# Patient Record
Sex: MALE
[De-identification: names, ages, dates, MRNs, and addresses within clinical notes are randomized per-mention and may not be internally consistent; named-entity substitution may affect disease eponyms.]

---

## 2020-10-31 ENCOUNTER — NURSE TRIAGE (OUTPATIENT)
Dept: OTHER | Facility: CLINIC | Age: 65
End: 2020-10-31

## 2020-10-31 NOTE — TELEPHONE ENCOUNTER
Reason for Disposition   General information question, no triage required and triager able to answer question    Protocols used: INFORMATION ONLY CALL - NO TRIAGE-ADULT-AH    Calling for closest covid testing site. Recommend he check the department of health web site for up to date testing information. Offered to triage, he declined. Attention provider: Your patient utilized nurse triage services offered by employer, payer or community. This encounter includes an overview of the reason for call, assessment and recommended disposition. Please do not respond through this encounter as the response is not directed to a shared pool.

## 2021-03-11 ENCOUNTER — HOSPITAL ENCOUNTER (OUTPATIENT)
Dept: HOSPITAL 100 - IMMUN | Age: 66
LOS: 89 days | End: 2021-06-08
Payer: MEDICARE

## 2021-03-11 VITALS — BODY MASS INDEX: 39.7 KG/M2

## 2021-03-11 DIAGNOSIS — Z23: Primary | ICD-10-CM

## 2021-03-11 PROCEDURE — 0002A: CPT

## 2021-03-11 PROCEDURE — 91300: CPT

## 2021-03-11 PROCEDURE — 0001A: CPT

## 2021-03-11 RX ADMIN — RNA INGREDIENT BNT-162B2 30 MCG: 0.23 INJECTION, SUSPENSION INTRAMUSCULAR at 17:23

## 2021-04-01 RX ADMIN — RNA INGREDIENT BNT-162B2 30 MCG: 0.23 INJECTION, SUSPENSION INTRAMUSCULAR at 17:01

## 2022-05-15 ENCOUNTER — HOSPITAL ENCOUNTER (EMERGENCY)
Age: 67
Discharge: HOME | End: 2022-05-15
Payer: COMMERCIAL

## 2022-05-15 VITALS
HEART RATE: 81 BPM | RESPIRATION RATE: 18 BRPM | OXYGEN SATURATION: 97 % | SYSTOLIC BLOOD PRESSURE: 131 MMHG | DIASTOLIC BLOOD PRESSURE: 103 MMHG | TEMPERATURE: 99.3 F

## 2022-05-15 VITALS — BODY MASS INDEX: 37.3 KG/M2

## 2022-05-15 DIAGNOSIS — Z79.84: ICD-10-CM

## 2022-05-15 DIAGNOSIS — K57.32: Primary | ICD-10-CM

## 2022-05-15 DIAGNOSIS — Z79.899: ICD-10-CM

## 2022-05-15 DIAGNOSIS — F17.210: ICD-10-CM

## 2022-05-15 DIAGNOSIS — K59.00: ICD-10-CM

## 2022-05-15 LAB
ANION GAP: 7 (ref 5–15)
BUN SERPL-MCNC: 18 MG/DL (ref 7–18)
BUN/CREAT RATIO: 17.8 RATIO (ref 10–20)
CALCIUM SERPL-MCNC: 9.1 MG/DL (ref 8.5–10.1)
CARBON DIOXIDE: 25 MMOL/L (ref 21–32)
CHLORIDE: 103 MMOL/L (ref 98–107)
DEPRECATED RDW RBC: 41.1 FL (ref 35.1–43.9)
ERYTHROCYTE [DISTWIDTH] IN BLOOD: 12.6 % (ref 11.6–14.6)
EST GLOM FILT RATE - AFR AMER: 96 ML/MIN (ref 60–?)
ESTIMATED CREATININE CLEARANCE: 76.29 ML/MIN
GLUCOSE: 113 MG/DL (ref 74–106)
HCT VFR BLD AUTO: 42.9 % (ref 40–54)
HEMOGLOBIN: 14.3 G/DL (ref 13–16.5)
HGB BLD-MCNC: 14.3 G/DL (ref 13–16.5)
IMMATURE GRANULOCYTES COUNT: 0.06 X10^3/UL (ref 0–0)
MCV RBC: 88.6 FL (ref 80–94)
MEAN CORP HGB CONC: 33.3 G/DL (ref 32–36)
MEAN PLATELET VOL.: 10.8 FL (ref 6.2–12)
MUCOUS THREADS URNS QL MICRO: (no result) /HPF
NRBC FLAGGED BY ANALYZER: 0 % (ref 0–5)
PLATELET # BLD: 240 K/MM3 (ref 150–450)
PLATELET COUNT: 240 K/MM3 (ref 150–450)
POTASSIUM: 4.1 MMOL/L (ref 3.5–5.1)
RBC # BLD AUTO: 4.84 M/MM3 (ref 4.6–6.2)
RBC DISTRIBUTION WIDTH CV: 12.6 % (ref 11.6–14.6)
RBC DISTRIBUTION WIDTH SD: 41.1 FL (ref 35.1–43.9)
RBC UR QL: (no result) /HPF (ref 0–5)
RBC UR QL: 10 /UL
SP GR UR: 1.01 (ref 1–1.03)
SQUAMOUS URNS QL MICRO: (no result) /HPF (ref 0–5)
URINE PRESERVATIVE: (no result)
WBC # BLD AUTO: 15 K/MM3 (ref 4.4–11)
WHITE BLOOD COUNT: 15 K/MM3 (ref 4.4–11)

## 2022-05-15 PROCEDURE — 99284 EMERGENCY DEPT VISIT MOD MDM: CPT

## 2022-05-15 PROCEDURE — 85025 COMPLETE CBC W/AUTO DIFF WBC: CPT

## 2022-05-15 PROCEDURE — 81001 URINALYSIS AUTO W/SCOPE: CPT

## 2022-05-15 PROCEDURE — 80048 BASIC METABOLIC PNL TOTAL CA: CPT

## 2022-05-15 PROCEDURE — 74177 CT ABD & PELVIS W/CONTRAST: CPT

## 2022-05-15 PROCEDURE — 96374 THER/PROPH/DIAG INJ IV PUSH: CPT

## 2022-05-15 PROCEDURE — A4216 STERILE WATER/SALINE, 10 ML: HCPCS

## 2024-10-18 ENCOUNTER — HOSPITAL ENCOUNTER (OUTPATIENT)
Dept: HOSPITAL 100 - ED | Age: 69
Setting detail: OBSERVATION
LOS: 1 days | Discharge: HOME | End: 2024-10-19
Payer: MEDICARE

## 2024-10-18 VITALS — RESPIRATION RATE: 23 BRPM | HEART RATE: 88 BPM | DIASTOLIC BLOOD PRESSURE: 84 MMHG | SYSTOLIC BLOOD PRESSURE: 157 MMHG

## 2024-10-18 VITALS — RESPIRATION RATE: 16 BRPM | DIASTOLIC BLOOD PRESSURE: 88 MMHG | HEART RATE: 77 BPM | SYSTOLIC BLOOD PRESSURE: 146 MMHG

## 2024-10-18 VITALS
RESPIRATION RATE: 16 BRPM | TEMPERATURE: 96.9 F | OXYGEN SATURATION: 97 % | DIASTOLIC BLOOD PRESSURE: 76 MMHG | SYSTOLIC BLOOD PRESSURE: 160 MMHG | HEART RATE: 72 BPM

## 2024-10-18 VITALS
DIASTOLIC BLOOD PRESSURE: 72 MMHG | RESPIRATION RATE: 16 BRPM | HEART RATE: 84 BPM | TEMPERATURE: 97.88 F | SYSTOLIC BLOOD PRESSURE: 118 MMHG | OXYGEN SATURATION: 100 %

## 2024-10-18 VITALS
OXYGEN SATURATION: 97 % | TEMPERATURE: 98.24 F | SYSTOLIC BLOOD PRESSURE: 120 MMHG | DIASTOLIC BLOOD PRESSURE: 54 MMHG | HEART RATE: 76 BPM | RESPIRATION RATE: 16 BRPM

## 2024-10-18 VITALS — DIASTOLIC BLOOD PRESSURE: 88 MMHG | HEART RATE: 79 BPM | SYSTOLIC BLOOD PRESSURE: 146 MMHG

## 2024-10-18 VITALS — BODY MASS INDEX: 35.9 KG/M2 | BODY MASS INDEX: 35.6 KG/M2

## 2024-10-18 DIAGNOSIS — I31.39: ICD-10-CM

## 2024-10-18 DIAGNOSIS — Z23: ICD-10-CM

## 2024-10-18 DIAGNOSIS — Z87.891: ICD-10-CM

## 2024-10-18 DIAGNOSIS — I95.1: ICD-10-CM

## 2024-10-18 DIAGNOSIS — J45.909: ICD-10-CM

## 2024-10-18 DIAGNOSIS — Z79.899: ICD-10-CM

## 2024-10-18 DIAGNOSIS — E11.9: ICD-10-CM

## 2024-10-18 DIAGNOSIS — R06.09: ICD-10-CM

## 2024-10-18 DIAGNOSIS — R42: ICD-10-CM

## 2024-10-18 DIAGNOSIS — R07.89: Primary | ICD-10-CM

## 2024-10-18 DIAGNOSIS — E78.5: ICD-10-CM

## 2024-10-18 DIAGNOSIS — I10: ICD-10-CM

## 2024-10-18 DIAGNOSIS — I25.10: ICD-10-CM

## 2024-10-18 DIAGNOSIS — Z79.84: ICD-10-CM

## 2024-10-18 DIAGNOSIS — E66.9: ICD-10-CM

## 2024-10-18 DIAGNOSIS — K21.9: ICD-10-CM

## 2024-10-18 LAB
ANION GAP: 6 (ref 5–15)
BUN SERPL-MCNC: 29 MG/DL (ref 7–18)
BUN/CREAT RATIO: 23.6 RATIO (ref 10–20)
CALCIUM SERPL-MCNC: 9.5 MG/DL (ref 8.5–10.1)
CARBON DIOXIDE: 26 MMOL/L (ref 21–32)
CARDIAC TROPONIN I PNL SERPL HS: 5 PG/ML (ref 3–78)
CHLORIDE: 101 MMOL/L (ref 98–107)
DEPRECATED RDW RBC: 40.7 FL (ref 35.1–43.9)
ERYTHROCYTE [DISTWIDTH] IN BLOOD: 12.6 % (ref 11.6–14.6)
EST GLOM FILT RATE - AFR AMER: 76 ML/MIN (ref 60–?)
ESTIMATED CREATININE CLEARANCE: 74.55 ML/MIN
GLUCOSE: 112 MG/DL (ref 74–106)
HCT VFR BLD AUTO: 47.3 % (ref 40–54)
HEMOGLOBIN: 16.2 G/DL (ref 13–16.5)
HGB BLD-MCNC: 16.2 G/DL (ref 13–16.5)
IMMATURE GRANULOCYTES COUNT: 0.04 X10^3/UL (ref 0–0)
MAGNESIUM: 1.9 MG/DL (ref 1.6–2.6)
MCV RBC: 88.1 FL (ref 80–94)
MEAN CORP HGB CONC: 34.2 G/DL (ref 32–36)
MEAN PLATELET VOL.: 10.9 FL (ref 6.2–12)
NRBC FLAGGED BY ANALYZER: 0 % (ref 0–5)
PLATELET # BLD: 266 K/MM3 (ref 150–450)
PLATELET COUNT: 266 K/MM3 (ref 150–450)
POTASSIUM: 4.3 MMOL/L (ref 3.5–5.1)
RBC # BLD AUTO: 5.37 M/MM3 (ref 4.6–6.2)
RBC DISTRIBUTION WIDTH CV: 12.6 % (ref 11.6–14.6)
RBC DISTRIBUTION WIDTH SD: 40.7 FL (ref 35.1–43.9)
TROPONIN-I HS: 5 PG/ML (ref 3–78)
WBC # BLD AUTO: 14 K/MM3 (ref 4.4–11)
WHITE BLOOD COUNT: 14 K/MM3 (ref 4.4–11)

## 2024-10-18 PROCEDURE — 71270 CT THORAX DX C-/C+: CPT

## 2024-10-18 PROCEDURE — 90662 IIV NO PRSV INCREASED AG IM: CPT

## 2024-10-18 PROCEDURE — G0378 HOSPITAL OBSERVATION PER HR: HCPCS

## 2024-10-18 PROCEDURE — 80061 LIPID PANEL: CPT

## 2024-10-18 PROCEDURE — 82962 GLUCOSE BLOOD TEST: CPT

## 2024-10-18 PROCEDURE — 83735 ASSAY OF MAGNESIUM: CPT

## 2024-10-18 PROCEDURE — 99285 EMERGENCY DEPT VISIT HI MDM: CPT

## 2024-10-18 PROCEDURE — A9500 TC99M SESTAMIBI: HCPCS

## 2024-10-18 PROCEDURE — 99221 1ST HOSP IP/OBS SF/LOW 40: CPT

## 2024-10-18 PROCEDURE — 85025 COMPLETE CBC W/AUTO DIFF WBC: CPT

## 2024-10-18 PROCEDURE — 80048 BASIC METABOLIC PNL TOTAL CA: CPT

## 2024-10-18 PROCEDURE — 93017 CV STRESS TEST TRACING ONLY: CPT

## 2024-10-18 PROCEDURE — 84484 ASSAY OF TROPONIN QUANT: CPT

## 2024-10-18 PROCEDURE — 78452 HT MUSCLE IMAGE SPECT MULT: CPT

## 2024-10-18 PROCEDURE — 71045 X-RAY EXAM CHEST 1 VIEW: CPT

## 2024-10-18 PROCEDURE — G0008 ADMIN INFLUENZA VIRUS VAC: HCPCS

## 2024-10-18 PROCEDURE — 36415 COLL VENOUS BLD VENIPUNCTURE: CPT

## 2024-10-18 PROCEDURE — A4216 STERILE WATER/SALINE, 10 ML: HCPCS

## 2024-10-18 PROCEDURE — 93005 ELECTROCARDIOGRAM TRACING: CPT

## 2024-10-18 PROCEDURE — 80053 COMPREHEN METABOLIC PANEL: CPT

## 2024-10-18 RX ADMIN — NITROGLYCERIN 0.4 MG: 0.4 TABLET, ORALLY DISINTEGRATING SUBLINGUAL at 20:23

## 2024-10-18 NOTE — EDS_ITS
"HPI    
History of Present Illness    
Chief Complaint: Hypotension    
Detail of Chief Complaint: Orthostatic hypotension.  Patient here because of   
exertional chest pain    
Informant: patient    
Onset/Context/Timing    
Onset: Days (Past 7 to 10 days)    
Context: Sudden Onset    
Timing: Intermittent    
Quality: Pressure    
Location: Midsternal with radiation to the left side occasionally right    
Current Severity: Mild    
Maximum Severity: Severe    
Worsened by: Activity i.e. walking presently required more activity 10 days ago    
Relieved by: Rest    
Associated Symptoms    
Associated Symptoms: Slight dyspnea    
Narrative    
Narrative:     
 patient is a 68-year-old male.  He has a history of dyslipidemia, hypertension   
and diabetes for approximately 8 to 10 years.  He also has history of GERD.    
This is different than his GERD.  This pain is described as a pressure sensation  
mid chest that radiates to the left side predominantly.  It has radiated to the   
right side.  This occurs with activity and resolves with rest.  He saw his   
doctor.  Apparently there was concern for orthostatic hypotension.  He does   
admit to getting dizzy when he has the chest pressure.    
    
He denies black or maroon-colored stool.  Nuys history of hiatal hernia or   
peptic ulcer disease.    
    
He denies leg pain, swelling or discoloration.  He denies symptoms of   
claudication.  He denies orthopnea or PND.  He was a smoker of 1.5 packs/day for  
40 years.  He quit approximately 5 years ago.  No family member with coronary   
disease at a young age.  There is history of cardiovascular disease, however.    
Prior similar symptoms: No    
Recent Illness/Hospitalization: No    
    
[facility]    
Medical History (Updated 10/18/24 @ 22:57 by Dr. Scott Epstein MD)    
    
Former tobacco use    
Contact dermatitis and eczema due to oils and greases    
GERD (gastroesophageal reflux disease)    
Asthma    
Dyslipidemia    
HTN (hypertension)    
Diabetes    
Obesity (BMI 30-39.9)    
    
    
                                Home Medications    
    
    
    
?Medication ?Instructions ?Recorded ?Last Taken ?Type    
     
albuterol sulfate 90 mcg/actuation 1 puff inhalation Q4H PRN PRN 01/15/15   
01/15/15 02:00 History    
    
aerosol inhaler (Ventolin HFA) Shortness Of Breath       
     
metformin 500 mg tablet 1,000 mg PO BIDCM 01/15/15 01/15/15 02:00 History    
     
glipizide 10 mg tablet, extended 20 mg PO DAILY 05/15/22 Unknown History    
    
release 24 hr        
     
lisinopril 10 mg tablet 20 mg PO DAILY 05/15/22 Unknown History    
     
naproxen 500 mg tablet,delayed 500 mg PO DAILY 10/18/24 Unknown History    
    
release (EC-Naprosyn)        
    
    
    
                                            
    
    
    
Allergy/AdvReac Type Severity Reaction Status Date / Time    
     
Penicillins Allergy  Anaphylaxis Verified 10/18/24 17:49    
     
pentolinium Allergy  Rash Verified 10/18/24 17:49    
    
    
    
Surgical History (Reviewed 10/18/24 @ 21:49 by Dr. Scott Epstein MD)    
    
Hx of hernia repair    
    
    
Social History (Updated 10/18/24 @ 22:47 by Dr. Doris Lynn MD)    
household members:  spouse     
Smoking Status:  Former smoker     
how long ago did patient quit smoking:  Quit ~ 5 yrs prior, smoked 1.5 ppd x 40   
yrs until quit.     
    
    
    
ROS    
ROS ED    
Constitutional    
Constitutional ED: Denies chills, fever(s) or subjective    
Eyes    
Eyes: Denies blurry vision or change in vision    
ENT    
ENT ED: Denies ear pain or rhinorrhea    
Cardiovascular    
Cardiovascular: Reports chest pain; Denies orthopnea, palpitations, paroxysmal   
nocturnal dyspnea or racing heartbeat    
Respiratory/Chest    
Respiratory/Chest: Reports dyspnea; Denies cough, dyspnea on exertion, orthopnea  
or paroxysmal nocturnal dyspnea    
Gastrointestinal    
[place]
[id number]|2024-10-18 22:49:00|2024-10-18 22:49:00|HP.PCM.HOS_ITS||Medical Records Department|[id number]|"HPI - General

## 2024-10-18 NOTE — XMS RPT_ITS
"  
                         Comprehensive CCD (C-CDA v2.1)  
  
                          Created on: 2024  
  
  
Edy Mr. Erwin  
External Reference #: CDR,PersonID:[id number]  
: [id number]  
Sex: Male  
  
Demographics  
  
  
                                        Address             [address]  
[place]  [address]  
   
                                        Home Phone          [phone]  
   
                                        Mobile Phone        [phone]  
   
                                        Preferred Language  en  
   
                                        Marital Status        
   
                                        Sabianism Affiliation Unknown  
   
                                        Race                White  
   
                                        Ethnic Group        Not  or Lati  
no  
  
  
Author  
  
  
                                        Organization        [facility]  
  
  
Care Team Providers  
  
  
                                Care Team Member Name Role            Phone  
   
                                Silvio ROUSSEAU, Trent JIN Primary Care Provider [phone]  
[phone]  
   
                                GEGE GREEN Attending       Unavailable  
   
                                SILVIO, TRENT JIN Primary Care    Unavailable  
   
                                MAURO MCCARTHY Attending       Unavailable  
   
                                GEGE GREEN Referring       Unavailable  
   
                                SILVIO, TRENT JIN Primary Care    Unavailable  
   
                                TAJ VEGAS Attending       Unavailable  
   
                                GEGE GREEN Referring       Unavailable  
   
                                SILVIO, TRENT JIN Primary Care    Unavailable  
   
                                GEGE GREEN Referring       Unavailable  
   
                                SILVIO, TRENT JIN Primary Care    Unavailable  
   
                                SILVIO, TRENT JIN Referring       Unavailable  
   
                                SILVIO, TRENT JIN Primary Care    Unavailable  
   
                                SILVIO, TRENT JIN Primary Care    Unavailable  
   
                                SILVIO, TRENT JIN Attending       Unavailable  
   
                                SILVIO, TRENT JIN Primary Care    Unavailable  
   
                                GEGE GREEN Referring       Unavailable  
   
                                SILVIO, TRENT JIN Attending       Unavailable  
   
                                SILVIO, TRENT JIN Primary Care    Unavailable  
   
                                Trent Greenfield MD Primary Care Provider [phone]  
[phone]  
  
  
  
Allergies  
  
  
                                                    Allergy   
Classification                          Reported   
Allergen(s)               Allergy Type              Date of   
Onset                     Reaction(s)               Facility  
   
                                                      
(20 sources)                            atorvastatin;   
Translations:   
[ATORVASTATIN]  Drug Allergy    08-      Myalgia         [facility]  
Work Phone:   
[phone]  
0  
   
                                                      
(4 sources)                             Penicillins;   
Translations:   
[PENICILLINS]   Drug Allergy    2005      Swelling        [facility]  
   
                                                      
(20 sources)                            Petrolatum;   
Translations:   
[PETROLATUM,   
YELLOW]         Drug Allergy    11-      Intolerance     [facility]  
   
                         
[id number]|2024-10-19 10:27:00|2024-10-19 10:27:00|DCINST_ITS||Medical Records Department|[id number]|"Discharge Instructions

## 2024-10-18 NOTE — PCM.HP.STD
"HPI - General
General
Date of Admission: 10/18/24
Date of Service: 10/18/24
Chief Complaint: Chest pain, dyspnea, dizziness, exertional, intermittent headache, elevated blood pressure.
HPI Narrative
The patient is a 69 y/o M w/ PMHx: Former tobacco use, Obesity, HTN, HLD, Asthma, GERD, Diabetes mellitus type II who presents to the [facility] ED on 10/18/24 with history of exertional chest discomfort with initial concerns for elevated blood pressure 
above his normal range both intermittently over the last 7 to 10 days describing the chest discomfort as a pressure-like sensation midsternal with radiation to the left side and occasionally also the right side of the chest more prominent when he is 
attempting to be more active and improved with rest with associated mild dyspnea with dizziness when he is having the chest discomfort prompting ED evaluation to be cautious.  In the ED patient eventually noted to be chest pain-free however he is at 
rest and not active.  He has had also intermittent diffuse throbbing 3-4 of 10 in severity headache with no sound or light sensitivity that seems to come on also when he is having discomfort.  He also reports a vague history of intermittent left 
very focal abdominal discomfort with alternating diarrhea and constipation with concern that he has IBS but is not talk to or discussed any of these items with his primary care physician which was urged.  In the ED he denies any current chest 
discomfort.  Workup in the ED included T98, heart rate 84, /72, respiratory rate 16, 100% on room air with most recent repeat vitals heart rate 88, /84, respiratory rate 23, CBC with WBC 14, hemoglobin 16.2, platelet 266 with left shift, 
BMP with sodium 133, BUN/creatinine 29/1.23, glucose 112, troponin 5 with repeat delta 5, chest x-ray with no acute cardiopulmonary findings, EKG with sinus rhythm with no acute evidence of ischemia.  In the ED patient administered full-strength 
aspirin therapy and sublingual nitroglycerin.


[facility]
Medical History (Reviewed 10/19/24 @ 00:50 by Dr. Doris Lynn MD)

IBS (irritable bowel syndrome)
Former tobacco use
Contact dermatitis and eczema due to oils and greases
GERD (gastroesophageal reflux disease)
Asthma
Dyslipidemia
HTN (hypertension)
Diabetes
Obesity (BMI 30-39.9)


Home Medications

?Medication ?Instructions ?Recorded ?Last Taken ?Type
albuterol sulfate 90 mcg/actuation 1 puff inhalation Q4H PRN PRN 01/15/15 01/15/15 02:00 History
aerosol inhaler (Ventolin HFA) Shortness Of Breath   
metformin 500 mg tablet 1,000 mg PO BIDCM 01/15/15 01/15/15 02:00 History
glipizide 10 mg tablet, extended 20 mg PO DAILY 05/15/22 Unknown History
release 24 hr    
lisinopril 10 mg tablet 20 mg PO DAILY 05/15/22 Unknown History
naproxen 500 mg tablet,delayed 500 mg PO DAILY 10/18/24 Unknown History
release (EC-Naprosyn)    




Allergy/AdvReac Type Severity Reaction Status Date / Time
Penicillins Allergy  Anaphylaxis Verified 10/18/24 17:49
pentolinium Allergy  Rash Verified 10/18/24 17:49


Family History (Reviewed 10/19/24 @ 00:50 by Dr. Doris Lynn MD)
Mother
 CVA (cerebral vascular accident)
 Hypertension
 Diabetes
Father
 Diabetes
 Leukemia


Surgical History (Reviewed 10/19/24 @ 00:50 by Dr. Doris Lynn MD)

History of total left hip replacement
History of umbilical hernia repair


Social History (Updated 10/19/24 @ 00:51 by Dr. Doris Lynn MD)
household members:  spouse 
Smoking Status:  Former smoker 
how long ago did patient quit smoking:  Quit ~ 5 yrs prior, smoked 1.5 ppd x 40 yrs until quit. 
alcohol intake:  former year quit: 2018 
details:  Would occasional drink heavier but not daily, denies EtOH abuse history. 
substance use type:  does not use 



ROS
ROS Narrative
Admission Review of Systems:
CONSTITUTIONAL: No weight loss, fever, chills, + weakness or fatigue. 
HEENT: + Intermittent lightheadedness/dizziness, headache.
Eyes: No visual loss, blurred vision, double 
[id number]|2024-10-18 17:50:41|2024-10-18 17:50:41|ED.RN||||"PT BP WHILE /77"

## 2024-10-18 NOTE — RAD_ITS
REPORT-ID:[id number] 
 
STUDY:   X-RAY CHEST 
 
REASON FOR EXAM:   Male, 68 years old.  chest pain 
 
TECHNIQUE:   Single frontal view of the chest. 
 
COMPARISON:   January 15, 2015 
___________________________________ 
 
FINDINGS: 
 
The lungs are clear and expanded.  There is no demonstrated pleural 
abnormality. 
Calcified granuloma right lung base and right hilum. 
Normal size heart.   Normal mediastinum and edgar.  Normal visualized 
pulmonary arteries.  Normal visualized aortic arch and descending thoracic 
aorta. 
 
Normal visualized thoracic spine.  Normal visualized ribs, clavicles, and 
shoulders. 
 
There is no demonstrated abnormality of the visualized soft tissue 
structures of the upper abdomen. 
___________________________________ 
 
ORDER #: [id number] RAD/Chest 1 View (Portable)  
IMPRESSION:  
No acute disease  
 
  
Electronically Signed:  
Gian Hopper MD  
2024/10/18 at 20:43 EDT  
Reading Location ID and State: [id number] / [place]  
Tel [phone], Service support  [phone], Fax [phone]

## 2024-10-18 NOTE — EX.ED.DYSGE1
"HPI
History of Present Illness
Chief Complaint: Hypotension
Detail of Chief Complaint: Orthostatic hypotension.  Patient here because of exertional chest pain
Informant: patient
Onset/Context/Timing
Onset: Days (Past 7 to 10 days)
Context: Sudden Onset
Timing: Intermittent
Quality: Pressure
Location: Midsternal with radiation to the left side occasionally right
Current Severity: Mild
Maximum Severity: Severe
Worsened by: Activity i.e. walking presently required more activity 10 days ago
Relieved by: Rest
Associated Symptoms
Associated Symptoms: Slight dyspnea
Narrative
Narrative: 
 patient is a 68-year-old male.  He has a history of dyslipidemia, hypertension and diabetes for approximately 8 to 10 years.  He also has history of GERD.  This is different than his GERD.  This pain is described as a pressure sensation mid chest 
that radiates to the left side predominantly.  It has radiated to the right side.  This occurs with activity and resolves with rest.  He saw his doctor.  Apparently there was concern for orthostatic hypotension.  He does admit to getting dizzy when 
he has the chest pressure.

He denies black or maroon-colored stool.  Nuys history of hiatal hernia or peptic ulcer disease.

He denies leg pain, swelling or discoloration.  He denies symptoms of claudication.  He denies orthopnea or PND.  He was a smoker of 1.5 packs/day for 40 years.  He quit approximately 5 years ago.  No family member with coronary disease at a young 
age.  There is history of cardiovascular disease, however.
Prior similar symptoms: No
Recent Illness/Hospitalization: No

[facility]
Medical History (Updated 10/18/24 @ 22:57 by Dr. Scott Epstein MD)

Former tobacco use
Contact dermatitis and eczema due to oils and greases
GERD (gastroesophageal reflux disease)
Asthma
Dyslipidemia
HTN (hypertension)
Diabetes
Obesity (BMI 30-39.9)


Home Medications

?Medication ?Instructions ?Recorded ?Last Taken ?Type
albuterol sulfate 90 mcg/actuation 1 puff inhalation Q4H PRN PRN 01/15/15 01/15/15 02:00 History
aerosol inhaler (Ventolin HFA) Shortness Of Breath   
metformin 500 mg tablet 1,000 mg PO BIDCM 01/15/15 01/15/15 02:00 History
glipizide 10 mg tablet, extended 20 mg PO DAILY 05/15/22 Unknown History
release 24 hr    
lisinopril 10 mg tablet 20 mg PO DAILY 05/15/22 Unknown History
naproxen 500 mg tablet,delayed 500 mg PO DAILY 10/18/24 Unknown History
release (EC-Naprosyn)    




Allergy/AdvReac Type Severity Reaction Status Date / Time
Penicillins Allergy  Anaphylaxis Verified 10/18/24 17:49
pentolinium Allergy  Rash Verified 10/18/24 17:49


Surgical History (Reviewed 10/18/24 @ 21:49 by Dr. Scott Epstein MD)

Hx of hernia repair


Social History (Updated 10/18/24 @ 22:47 by Dr. Doris Lynn MD)
household members:  spouse 
Smoking Status:  Former smoker 
how long ago did patient quit smoking:  Quit ~ 5 yrs prior, smoked 1.5 ppd x 40 yrs until quit. 



ROS
ROS ED
Constitutional
Constitutional ED: Denies chills, fever(s) or subjective
Eyes
Eyes: Denies blurry vision or change in vision
ENT
ENT ED: Denies ear pain or rhinorrhea
Cardiovascular
Cardiovascular: Reports chest pain; Denies orthopnea, palpitations, paroxysmal nocturnal dyspnea or racing heartbeat
Respiratory/Chest
Respiratory/Chest: Reports dyspnea; Denies cough, dyspnea on exertion, orthopnea or paroxysmal nocturnal dyspnea
Gastrointestinal
Gastrointestinal: Denies abdominal pain, constipation, melena, nausea or vomiting
Genitourinary
Genitourinary ED: Denies dysuria, hematuria or urinary frequency
Musculoskeletal
Musculoskeletal: Denies back pain or neck pain
Integumentary
Denies rash
Hematologic/Lymphatic
Hematologic/Lymphatic: Reports systems reviewed and no addt'l complaints, except as documented

EXAM
Physical Exam
Const
Vital Signs: 



 10/18/24
17:47 10/18/24
19:46 10/18/24
20:01
Temperature 98 F  
Temperature Source Oral  
Pulse Rate 84 77 
Respiratory Rate 16 16 
Bloo
[id number]|2024-10-19 10:27:48|2024-10-19 10:27:48|PCM.DC||||"Discharge Instructions

## 2024-10-19 VITALS — OXYGEN SATURATION: 97 %

## 2024-10-19 VITALS
HEART RATE: 73 BPM | SYSTOLIC BLOOD PRESSURE: 145 MMHG | OXYGEN SATURATION: 99 % | TEMPERATURE: 97.34 F | RESPIRATION RATE: 16 BRPM | DIASTOLIC BLOOD PRESSURE: 79 MMHG

## 2024-10-19 VITALS
TEMPERATURE: 98.1 F | RESPIRATION RATE: 16 BRPM | SYSTOLIC BLOOD PRESSURE: 123 MMHG | OXYGEN SATURATION: 97 % | DIASTOLIC BLOOD PRESSURE: 69 MMHG | HEART RATE: 78 BPM

## 2024-10-19 VITALS — OXYGEN SATURATION: 95 %

## 2024-10-19 LAB
ALANINE AMINOTRANSFER ALT/SGPT: 24 U/L (ref 16–61)
ALBUMIN SERPL-MCNC: 3.8 G/DL (ref 3.2–5)
ALKALINE PHOSPHATASE: 65 U/L (ref 45–117)
ANION GAP: 7 (ref 5–15)
AST(SGOT): 21 U/L (ref 15–37)
BUN SERPL-MCNC: 27 MG/DL (ref 7–18)
BUN/CREAT RATIO: 24.3 RATIO (ref 10–20)
CALCIUM SERPL-MCNC: 9.1 MG/DL (ref 8.5–10.1)
CARBON DIOXIDE: 24 MMOL/L (ref 21–32)
CHLORIDE: 103 MMOL/L (ref 98–107)
CHOLEST SERPL-MCNC: 198 MG/DL
DEPRECATED RDW RBC: 40.5 FL (ref 35.1–43.9)
ERYTHROCYTE [DISTWIDTH] IN BLOOD: 12.4 % (ref 11.6–14.6)
EST GLOM FILT RATE - AFR AMER: 85 ML/MIN (ref 60–?)
ESTIMATED CREATININE CLEARANCE: 82.22 ML/MIN
GLOBULIN: 3.6 G/DL (ref 2.2–4.2)
GLUCOSE: 140 MG/DL (ref 74–106)
HCT VFR BLD AUTO: 44.2 % (ref 40–54)
HEMOGLOBIN: 14.3 G/DL (ref 13–16.5)
HGB BLD-MCNC: 14.3 G/DL (ref 13–16.5)
IMMATURE GRANULOCYTES COUNT: 0.03 X10^3/UL (ref 0–0)
MCV RBC: 88.9 FL (ref 80–94)
MEAN CORP HGB CONC: 32.4 G/DL (ref 32–36)
MEAN PLATELET VOL.: 11 FL (ref 6.2–12)
NRBC FLAGGED BY ANALYZER: 0 % (ref 0–5)
PLATELET # BLD: 222 K/MM3 (ref 150–450)
PLATELET COUNT: 222 K/MM3 (ref 150–450)
POTASSIUM: 4.6 MMOL/L (ref 3.5–5.1)
RBC # BLD AUTO: 4.97 M/MM3 (ref 4.6–6.2)
RBC DISTRIBUTION WIDTH CV: 12.4 % (ref 11.6–14.6)
RBC DISTRIBUTION WIDTH SD: 40.5 FL (ref 35.1–43.9)
TRIGLYCERIDES: 320 MG/DL
TROPONIN-I HS: 4 PG/ML (ref 3–78)
VLDLC SERPL-MCNC: 64 MG/DL (ref 5–40)
WBC # BLD AUTO: 10.4 K/MM3 (ref 4.4–11)
WHITE BLOOD COUNT: 10.4 K/MM3 (ref 4.4–11)

## 2024-10-19 RX ADMIN — INFLUENZA A VIRUS A/VICTORIA/4897/2022 IVR-238 (H1N1) ANTIGEN (FORMALDEHYDE INACTIVATED), INFLUENZA A VIRUS A/CALIFORNIA/122/2022 SAN-022 (H3N2) ANTIGEN (FORMALDEHYDE INACTIVATED), AND INFLUENZA B VIRUS B/MICHIGAN/01/2021 ANTIGEN (FORMALDEHYDE INACTIVATED) 180 MCG: 60; 60; 60 INJECTION, SUSPENSION INTRAMUSCULAR at 10:20

## 2024-10-19 RX ADMIN — ASPIRIN 81 MG: 81 TABLET, COATED ORAL at 06:20

## 2024-10-19 NOTE — DS.PCM_ITS
"Providers    
Date of Admission: 10/18/24    
Date of Discharge: 10/19/24    
Primary Care Physician:     
Dr. Trent Greenfield MD    
    
Reason For Visit: CHEST PAIN    
    
Diagnosis    
Discharge Diagnosis    
(1) Exertional chest pain:     
      Status: Acute    
      Code(s):    
R07.9 - Chest pain, unspecified    
    
Plan    
1.  Musculoskeletal chest pain    
    
#2 small loculated pericardial effusion    
    
#3 type 2 diabetes    
    
#4 essential hypertension    
    
Medications at Discharge    
Home Medications    
    
albuterol sulfate 90 mcg/actuation aerosol inhaler (Ventolin HFA) 1 puff   
inhalation Q4H PRN PRN Shortness Of Breath 01/15/15     
metformin 500 mg tablet 1,000 mg PO BIDCM 01/15/15     
glipizide 10 mg tablet, extended release 24 hr 20 mg PO DAILY 05/15/22     
lisinopril 10 mg tablet 20 mg PO DAILY 05/15/22     
naproxen 500 mg tablet,delayed release (EC-Naprosyn) 500 mg PO DAILY 10/18/24     
    
    
    
Hospital Course    
Operations    
None    
Procedures    
Nuclear stress test    
Summary of Care Provided    
Minutes Spent on Discharge: 31    
Hospital Course:     
This 68-year-old white male was seen in the emergency room at [facility] with complaints of exertional chest pain and chest pain while at rest   
along with lightheadedness.  Patient denied any radiation of the pain up into   
his neck or down his arms.  Workup in the ER included a troponin which was   
unremarkable, EKG did not show any acute ischemic changes, and chest x-ray   
showed no acute disease.  Patient was placed in observation status on PCU,   
serial cardiac enzymes were unremarkable.  Patient underwent a resting nuclear   
chemical stress test which was negative for ischemia.  I discussed further   
testing with the patient and I ordered a chest CT on the patient with contrast,   
it was negative except for a small loculated pericardial effusion 1.5 cm in   
diameter.  I did not feel this was causing patient's chest pain, I felt it was   
most likely that the patient had musculoskeletal chest discomfort.  I contacted   
the patient's PCP covering physician and relayed a message to that physician to   
have the patient follow-up with Dr. Greenfield who is his PCP.  This follow-up   
should include an echocardiogram, the abnormality could be a pericardial cyst.    
I also left a text message on Dr. Trent Greenfield's phone number.    
    
On 10/19/2024, patient was seen and examined: On examination he appeared in good  
health and spirits. Vital signs as documented. Skin warm and dry and without   
overt rashes. Neck without JVD, neck was supple, trachea midline, thyroid was   
normal. Lungs clear bilaterally, normal air movement was noted. Heart exam   
notable for regular rhythm, normal sounds and absence of murmurs, rubs or   
gallops. Abdomen unremarkable and without evidence of organomegaly, masses, or   
abdominal aortic enlargement.  Bowel sounds are present, abdomen is not   
distended.  Extremities nonedematous, no cyanosis was noted, no clubbing was   
noted.  Neuro: Cranial nerves II through XII are grossly intact, no focal motor   
deficits were noted, sensation to light touch and pinprick intact, motor exam   
5/5 throughout.  Psych: Patient is alert and oriented x3, he does not appear   
anxious or depressed, he does not appear agitated.    
    
On 10/19/2024, patient was discharged home in stable condition-as a further   
note, due to his complaints of lightheadedness I recommended the patient cut his  
blood pressure medication down to lisinopril 10 mg daily and monitor his blood   
pressure at home to maintain a systolic reading between 130 and 140.    
    
Weight / BMI    
                                     Weight    
    
    
    
Weight:                        112.5 kg                                           
    
     
Body Mass Index (BMI)          35.6                 
[id number]|2024-10-19 10:55:00|2024-10-19 12:51:00|CT_ITS||Imaging Services|[id number]|"REPORT-ID:[id number]

## 2024-10-19 NOTE — PCM.DC.SUM
"Providers
Date of Admission: 10/18/24
Date of Discharge: 10/19/24
Primary Care Physician: 
Dr. Trent Greenfield MD

Reason For Visit: CHEST PAIN

Diagnosis
Discharge Diagnosis
(1) Exertional chest pain: 
      Status: Acute
      Code(s):
R07.9 - Chest pain, unspecified

Plan
1.  Musculoskeletal chest pain

#2 small loculated pericardial effusion

#3 type 2 diabetes

#4 essential hypertension

Medications at Discharge
Home Medications

albuterol sulfate 90 mcg/actuation aerosol inhaler (Ventolin HFA) 1 puff inhalation Q4H PRN PRN Shortness Of Breath 01/15/15 
metformin 500 mg tablet 1,000 mg PO BIDCM 01/15/15 
glipizide 10 mg tablet, extended release 24 hr 20 mg PO DAILY 05/15/22 
lisinopril 10 mg tablet 20 mg PO DAILY 05/15/22 
naproxen 500 mg tablet,delayed release (EC-Naprosyn) 500 mg PO DAILY 10/18/24 



Hospital Course
Operations
None
Procedures
Nuclear stress test
Summary of Care Provided
Minutes Spent on Discharge: 31
Hospital Course: 
This 68-year-old white male was seen in the emergency room at [facility] with complaints of exertional chest pain and chest pain while at rest along with lightheadedness.  Patient denied any radiation of the pain up into his neck or 
down his arms.  Workup in the ER included a troponin which was unremarkable, EKG did not show any acute ischemic changes, and chest x-ray showed no acute disease.  Patient was placed in observation status on PCU, serial cardiac enzymes were 
unremarkable.  Patient underwent a resting nuclear chemical stress test which was negative for ischemia.  I discussed further testing with the patient and I ordered a chest CT on the patient with contrast, it was negative except for a small 
loculated pericardial effusion 1.5 cm in diameter.  I did not feel this was causing patient's chest pain, I felt it was most likely that the patient had musculoskeletal chest discomfort.  I contacted the patient's PCP covering physician and relayed 
a message to that physician to have the patient follow-up with Dr. Greenfield who is his PCP.  This follow-up should include an echocardiogram, the abnormality could be a pericardial cyst.  I also left a text message on Dr. Trent Greenfield's phone 
number.

On 10/19/2024, patient was seen and examined: On examination he appeared in good health and spirits. Vital signs as documented. Skin warm and dry and without overt rashes. Neck without JVD, neck was supple, trachea midline, thyroid was normal. Lungs 
clear bilaterally, normal air movement was noted. Heart exam notable for regular rhythm, normal sounds and absence of murmurs, rubs or gallops. Abdomen unremarkable and without evidence of organomegaly, masses, or abdominal aortic enlargement.  
Bowel sounds are present, abdomen is not distended.  Extremities nonedematous, no cyanosis was noted, no clubbing was noted.  Neuro: Cranial nerves II through XII are grossly intact, no focal motor deficits were noted, sensation to light touch and 
pinprick intact, motor exam 5/5 throughout.  Psych: Patient is alert and oriented x3, he does not appear anxious or depressed, he does not appear agitated.

On 10/19/2024, patient was discharged home in stable condition-as a further note, due to his complaints of lightheadedness I recommended the patient cut his blood pressure medication down to lisinopril 10 mg daily and monitor his blood pressure at 
home to maintain a systolic reading between 130 and 140.

Weight / BMI
Weight

Weight:                        112.5 kg                                          
Body Mass Index (BMI)          35.6                                              



ABG / Lab / Microbiology Data

10/19/24 06:50          

10/19/24 06:50          

Laboratory: 
Laboratory Results - last 24 hr

10/18/24 19:42: WBC 14.0 H, RBC 5.37, Hgb 16.2, Hct 47.3, MCV 88.1, MCH 30.2, MCHC 34.2, RDW Std Deviation 40.7, RDW Coeff of Silvia 12.6, Plt Count 266, MPV 10.9, Immature Gran % (Auto) 0.300, Neut 
[id number]|2024-10-19 14:50:47|2024-10-19 14:50:47|PCM.DC.SUM||||"Providers

## 2024-10-19 NOTE — DS.PCM_ITS
"Providers    
Date of Admission: 10/18/24    
Date of Discharge: 10/19/24    
Primary Care Physician:     
Dr. Trent Greenfield MD    
    
Reason For Visit: CHEST PAIN    
    
Diagnosis    
Discharge Diagnosis    
(1) Exertional chest pain:     
      Status: Acute    
      Code(s):    
R07.9 - Chest pain, unspecified    
    
Medications at Discharge    
Home Medications    
    
albuterol sulfate 90 mcg/actuation aerosol inhaler (Ventolin HFA) 1 puff   
inhalation Q4H PRN PRN Shortness Of Breath 01/15/15     
metformin 500 mg tablet 1,000 mg PO BIDCM 01/15/15     
glipizide 10 mg tablet, extended release 24 hr 20 mg PO DAILY 05/15/22     
lisinopril 10 mg tablet 20 mg PO DAILY 05/15/22     
naproxen 500 mg tablet,delayed release (EC-Naprosyn) 500 mg PO DAILY 10/18/24     
    
    
    
Weight / BMI    
                                     Weight    
    
    
    
Weight:                        112.5 kg                                           
    
     
Body Mass Index (BMI)          35.6                                               
    
    
    
    
    
ABG / Lab / Microbiology Data    
    
                                                        10/19/24 06:50              
    
                                                        10/19/24 06:50              
    
Laboratory:     
                         Laboratory Results - last 24 hr    
    
10/18/24 19:42: WBC 14.0 H, RBC 5.37, Hgb 16.2, Hct 47.3, MCV 88.1, MCH 30.2,   
MCHC 34.2, RDW Std Deviation 40.7, RDW Coeff of Silvia 12.6, Plt Count 266, MPV   
10.9, Immature Gran % (Auto) 0.300, Neut % (Auto) 64.3, Lymph % (Auto) 27.8,   
Mono % (Auto) 6.3, Eos % (Auto) 0.9, Baso % (Auto) 0.4, Absolute Neuts (auto)   
9.0 H, Absolute Lymphs (auto) 3.91, Nucleated RBC % 0, Sodium 133 L, Potassium   
4.3, Chloride 101, Carbon Dioxide 26.0, Anion Gap 6, BUN 29 H, Creatinine 1.23,   
Estim Creat Clear Calc 74.55, Est GFR (MDRD) Af Amer 76, Est GFR (MDRD) Non-Af   
62, BUN/Creatinine Ratio 23.6 H, Glucose 112 H, Calcium 9.5, Troponin I High   
Sens 5    
10/18/24 22:14: Magnesium 1.9, Troponin I High Sens 5    
10/19/24 02:10: Troponin I High Sens 4    
10/19/24 06:23: POC Glucose 137 H    
10/19/24 06:50: WBC 10.4, RBC 4.97, Hgb 14.3, Hct 44.2, MCV 88.9, MCH 28.8, MCHC  
32.4  D, RDW Std Deviation 40.5, RDW Coeff of Silvia 12.4, Plt Count 222, MPV 11.0,  
Immature Gran % (Auto) 0.300, Neut % (Auto) 59.0, Lymph % (Auto) 31.1, Mono %   
(Auto) 7.8, Eos % (Auto) 1.4, Baso % (Auto) 0.4, Absolute Neuts (auto) 6.2,   
Absolute Lymphs (auto) 3.25, Nucleated RBC % 0, Sodium 133 L, Potassium 4.6,   
Chloride 103, Carbon Dioxide 24.0, Anion Gap 7, BUN 27 H, Creatinine 1.11, Estim  
Creat Clear Calc 82.22, Est GFR (MDRD) Af Amer 85, Est GFR (MDRD) Non-Af 70, B  
UN/Creatinine Ratio 24.3 H, Glucose 140 H, Calcium 9.1, Total Bilirubin 1.60 H,   
AST 21, ALT 24, Alkaline Phosphatase 65, Total Protein 7.4, Albumin 3.8,   
Globulin 3.6, Albumin/Globulin Ratio 1.1, Triglycerides 320 H, Cholesterol 198,   
LDL Cholesterol 100, VLDL Cholesterol 64 H, HDL Cholesterol 34 L    
    
    
Radiography    
Diagnostic Testing:     
                              Radiology Impression    
    
Chest X-Ray  10/18/24 19:49    
IMPRESSION:    
No acute disease    
     
Electronically Signed:    
Gian Hopper MD    
2024/10/18 at 20:43 EDT    
Reading Location ID and State: [id number] / [place]    
Tel [phone], Service support  [phone], Fax [phone]    
     
    
    
Chest CT  10/19/24 10:55    
IMPRESSION:    
     
1.  No CTA evidence of pulmonary thromboemboli, thoracic aortic aneurysm or    
dissection.    
     
2.  No CT evidence of pneumonia or acute chest abnormality.    
     
3.  1.3 cm thick loculated anterior midline pericardial effusion.    
     
Electronically Signed:    
Frank Fontaine MD    
2024/10/19 at 12:51 EDT    
Reading Location ID and State: [id number] / [place]    
Tel [phone]
[id number]|2024-10-18 19:49:00|2024-10-18 20:05:00|[id number]||Cardiovascular Services|[id number]|"Test Reason : [id number]

## 2024-10-21 NOTE — STRESSREP
Stress Test Report
Pharmacologic myocardial perfusion stress test.

68-year-old man with a history of chest pain

Resting EKG demonstrates sinus rhythm with a rate of 70 bpm.  Resting blood pressure is 144/84 mmHg.  0.4 mg of regadenoson was infused per usual protocol followed by rapid intravenous saline flush injection.  Continuous EKG monitoring was 
performed.  The maximum heart rate was 91 bpm which was 59% of max impacted heart rate the maximum workload was 1 metabolic equivalent.  At rest there were no ST or T wave changes noted to suggest ischemia and at peak infusion nonspecific ST changes 
were noted which did not meet the criteria for ischemia.    No clinical angina is noted.  The final blood pressure was 144/86 mmHg.

Myocardial perfusion protocol.
13.4 mCi of technetium 99m sestamibi was injected at rest.  0.4 mg of regadenoson was infused per usual protocol.  At peak infusion 41.0 mCi of technetium 99m sestamibi was injected stress images were obtained stress and rest images were 
reconstructed and compared in the short axis vertical long and horizontal long axis.  Gated images were also obtained.

Perfusion SPECT analysis:
Review of the stress images demonstrate normal uptake of tracer noted in all areas of the myocardium.  The resting images similar demonstrated normal uptake of tracer noted in all areas of the myocardium.  No areas of reversibility are noted to 
suggest ischemia and no previous infarct is noted.

Gated SPECT analysis:
The gated ejection fraction is 64%.

Conclusion:
Normal pharmacologic myocardial perfusion stress test.
Preserved ejection fraction.
